# Patient Record
Sex: FEMALE | Race: WHITE | NOT HISPANIC OR LATINO | URBAN - METROPOLITAN AREA
[De-identification: names, ages, dates, MRNs, and addresses within clinical notes are randomized per-mention and may not be internally consistent; named-entity substitution may affect disease eponyms.]

---

## 2019-02-28 NOTE — PATIENT DISCUSSION
Referring pt to [facility] for IOL eval OS +/- OD.  KMS rec goal near goal OS -1.75/-2.00 in the past has had monoV.  full disc with patient could also complete Sx OU near but will need DVO for all distance tasks.  Ed since pt success with monoV in past can do CV mono but will not see as well as with bilateral near targets.  Pt seems to understand these options well and may leave RGP OUT OD in prep for Sx OU.

## 2019-03-11 NOTE — PATIENT DISCUSSION
The patient was informed that with the Basic option and a near vision goal, there is no guarantee of achieving near vision without glasses after surgery. They will most likely need prescription glasses at all focal points. The patient elects Basic OS, goal of -2.00.

## 2019-03-19 NOTE — PROCEDURE NOTE: SURGICAL
"<span style=""font-weight:bold;"">MR #:</span> [id number]<GF /><br /><span style=""font-weight:bold;"">PREOPERATIVE DIAGNOSIS:</span> Cataract

## 2019-10-30 ENCOUNTER — IMPORTED ENCOUNTER (OUTPATIENT)
Dept: URBAN - METROPOLITAN AREA CLINIC 50 | Facility: CLINIC | Age: 69
End: 2019-10-30

## 2020-12-30 ENCOUNTER — IMPORTED ENCOUNTER (OUTPATIENT)
Dept: URBAN - METROPOLITAN AREA CLINIC 50 | Facility: CLINIC | Age: 70
End: 2020-12-30

## 2021-04-17 ASSESSMENT — TONOMETRY
OS_IOP_MMHG: 15
OS_IOP_MMHG: 26
OD_IOP_MMHG: 14
OD_IOP_MMHG: 24

## 2021-04-17 ASSESSMENT — VISUAL ACUITY
OS_PH: 20/40
OD_CC: J3
OS_CC: J3
OD_PH: 20/60
OD_CC: 20/70-1
OS_CC: 20/50+2